# Patient Record
Sex: MALE | Race: WHITE | NOT HISPANIC OR LATINO | RURAL
[De-identification: names, ages, dates, MRNs, and addresses within clinical notes are randomized per-mention and may not be internally consistent; named-entity substitution may affect disease eponyms.]

---

## 2022-03-23 ENCOUNTER — OFFICE VISIT (OUTPATIENT)
Dept: RURAL CLINIC 8 | Facility: CLINIC | Age: 77
End: 2022-03-23
Payer: MEDICARE

## 2022-03-23 DIAGNOSIS — K92.1 HEMATOCHEZIA: ICD-10-CM

## 2022-03-23 DIAGNOSIS — R19.5 LOOSE STOOLS: ICD-10-CM

## 2022-03-23 DIAGNOSIS — K86.2 PANCREATIC CYST: ICD-10-CM

## 2022-03-23 DIAGNOSIS — K86.89 PANCREATIC DUCT DILATED: ICD-10-CM

## 2022-03-23 DIAGNOSIS — K76.89 LIVER CYST: ICD-10-CM

## 2022-03-23 PROBLEM — 85057007: Status: ACTIVE | Noted: 2022-03-23

## 2022-03-23 PROCEDURE — 99204 OFFICE O/P NEW MOD 45 MIN: CPT | Performed by: INTERNAL MEDICINE

## 2022-03-23 RX ORDER — LOSARTAN POTASSIUM AND HYDROCHLOROTHIAZIDE 50; 12.5 MG/1; MG/1
1 TABLET TABLET, FILM COATED ORAL ONCE A DAY
Status: ACTIVE | COMMUNITY

## 2022-03-23 RX ORDER — AMLODIPINE BESYLATE 10 MG/1
1 TABLET TABLET ORAL ONCE A DAY
Status: ACTIVE | COMMUNITY

## 2022-03-23 NOTE — HPI-TODAY'S VISIT:
Patient mainly comes for an evaluation for diarrhea.  However, was able to review MRI of the abdomen that showed innumerable cysts throughout the pancreas.  The largest was 2.6 mm.  There were also several cysts within the liver.  There was dilation of the ventral pancreatic duct up to the level of the ampulla. - He was scheduled to see Dr. Ramirez for endoscopy and colonoscopy next month. - Pt days that about two months ago he had blood in the stool and was sent to the emergency room. it was enough to turn the  commode water red for three days in a row. -

## 2022-03-24 ENCOUNTER — TELEPHONE ENCOUNTER (OUTPATIENT)
Dept: URBAN - METROPOLITAN AREA CLINIC 105 | Facility: CLINIC | Age: 77
End: 2022-03-24

## 2022-03-24 ENCOUNTER — LAB OUTSIDE AN ENCOUNTER (OUTPATIENT)
Dept: URBAN - METROPOLITAN AREA CLINIC 105 | Facility: CLINIC | Age: 77
End: 2022-03-24

## 2022-04-28 ENCOUNTER — OFFICE VISIT (OUTPATIENT)
Dept: URBAN - METROPOLITAN AREA MEDICAL CENTER 33 | Facility: MEDICAL CENTER | Age: 77
End: 2022-04-28
Payer: MEDICARE

## 2022-04-28 DIAGNOSIS — C25.0 ADENOCARCINOMA OF HEAD OF PANCREAS: ICD-10-CM

## 2022-04-28 DIAGNOSIS — R13.19 CERVICAL DYSPHAGIA: ICD-10-CM

## 2022-04-28 DIAGNOSIS — K22.89 ESOPHAGEAL BLEED, NON-VARICEAL: ICD-10-CM

## 2022-04-28 PROCEDURE — 43239 EGD BIOPSY SINGLE/MULTIPLE: CPT | Performed by: INTERNAL MEDICINE

## 2022-04-28 PROCEDURE — 43242 EGD US FINE NEEDLE BX/ASPIR: CPT | Performed by: INTERNAL MEDICINE

## 2022-04-28 PROCEDURE — 43249 ESOPH EGD DILATION <30 MM: CPT | Performed by: INTERNAL MEDICINE

## 2022-04-28 RX ORDER — LOSARTAN POTASSIUM AND HYDROCHLOROTHIAZIDE 50; 12.5 MG/1; MG/1
1 TABLET TABLET, FILM COATED ORAL ONCE A DAY
Status: ACTIVE | COMMUNITY

## 2022-04-28 RX ORDER — AMLODIPINE BESYLATE 10 MG/1
1 TABLET TABLET ORAL ONCE A DAY
Status: ACTIVE | COMMUNITY

## 2022-07-19 NOTE — PHYSICAL EXAM NECK/THYROID:
Called mother, no answer, left message requesting call back.   normal appearance , without tenderness upon palpation , no deformities , trachea midline , Thyroid normal size , no thyroid nodules , no masses , no JVD , thyroid nontender

## 2023-11-21 ENCOUNTER — LAB OUTSIDE AN ENCOUNTER (OUTPATIENT)
Dept: RURAL CLINIC 2 | Facility: CLINIC | Age: 78
End: 2023-11-21

## 2023-11-21 ENCOUNTER — OFFICE VISIT (OUTPATIENT)
Dept: RURAL CLINIC 2 | Facility: CLINIC | Age: 78
End: 2023-11-21
Payer: MEDICARE

## 2023-11-21 ENCOUNTER — DASHBOARD ENCOUNTERS (OUTPATIENT)
Age: 78
End: 2023-11-21

## 2023-11-21 VITALS
WEIGHT: 175 LBS | SYSTOLIC BLOOD PRESSURE: 110 MMHG | BODY MASS INDEX: 25.05 KG/M2 | HEART RATE: 67 BPM | HEIGHT: 70 IN | TEMPERATURE: 97.1 F | DIASTOLIC BLOOD PRESSURE: 74 MMHG

## 2023-11-21 DIAGNOSIS — R13.19 ESOPHAGEAL DYSPHAGIA: ICD-10-CM

## 2023-11-21 DIAGNOSIS — Z86.79 PERSONAL HISTORY OF OTHER DISEASES OF THE CIRCULATORY SYSTEM: ICD-10-CM

## 2023-11-21 DIAGNOSIS — I25.2 HISTORY OF MI (MYOCARDIAL INFARCTION): ICD-10-CM

## 2023-11-21 DIAGNOSIS — Z90.410 ACQUIRED TOTAL ABSENCE OF PANCREAS: ICD-10-CM

## 2023-11-21 PROBLEM — 363418001: Status: ACTIVE | Noted: 2023-11-21

## 2023-11-21 PROBLEM — 399211009: Status: ACTIVE | Noted: 2023-11-21

## 2023-11-21 PROBLEM — 40890009: Status: ACTIVE | Noted: 2023-11-21

## 2023-11-21 PROBLEM — 266995000: Status: ACTIVE | Noted: 2023-11-21

## 2023-11-21 PROBLEM — 65958008: Status: ACTIVE | Noted: 2023-11-21

## 2023-11-21 PROBLEM — 112371000119108: Status: ACTIVE | Noted: 2023-11-21

## 2023-11-21 PROBLEM — 235595009: Status: ACTIVE | Noted: 2023-11-21

## 2023-11-21 PROBLEM — 428375006: Status: ACTIVE | Noted: 2023-11-21

## 2023-11-21 PROBLEM — 84410009: Status: ACTIVE | Noted: 2023-11-21

## 2023-11-21 PROCEDURE — 99214 OFFICE O/P EST MOD 30 MIN: CPT | Performed by: NURSE PRACTITIONER

## 2023-11-21 RX ORDER — AMLODIPINE BESYLATE 10 MG/1
1 TABLET TABLET ORAL ONCE A DAY
Status: ACTIVE | COMMUNITY

## 2023-11-21 RX ORDER — PANCRELIPASE 36000; 180000; 114000 [USP'U]/1; [USP'U]/1; [USP'U]/1
AS DIRECTED CAPSULE, DELAYED RELEASE PELLETS ORAL
Status: ACTIVE | COMMUNITY

## 2023-11-21 RX ORDER — LOSARTAN POTASSIUM AND HYDROCHLOROTHIAZIDE 50; 12.5 MG/1; MG/1
1 TABLET TABLET, FILM COATED ORAL ONCE A DAY
Status: DISCONTINUED | COMMUNITY

## 2023-11-21 RX ORDER — METFORMIN HYDROCHLORIDE 500 MG/1
1 TABLET WITH A MEAL TABLET, FILM COATED ORAL ONCE A DAY
Status: ACTIVE | COMMUNITY

## 2023-11-21 RX ORDER — PANTOPRAZOLE SODIUM 40 MG/1
1 TABLET TABLET, DELAYED RELEASE ORAL ONCE A DAY
Status: ACTIVE | COMMUNITY

## 2023-11-21 RX ORDER — ATORVASTATIN CALCIUM 80 MG/1
1 TABLET TABLET, FILM COATED ORAL ONCE A DAY
Status: ACTIVE | COMMUNITY

## 2023-11-21 RX ORDER — TAMSULOSIN HYDROCHLORIDE 0.4 MG/1
1 CAPSULE CAPSULE ORAL ONCE A DAY
Status: ACTIVE | COMMUNITY

## 2023-11-21 RX ORDER — ASPIRIN 81 MG/1
1 TABLET TABLET, CHEWABLE ORAL ONCE A DAY
Status: ACTIVE | COMMUNITY

## 2023-11-21 RX ORDER — METOPROLOL TARTRATE 25 MG/1
1 TABLET WITH FOOD TABLET, FILM COATED ORAL TWICE A DAY
Status: ACTIVE | COMMUNITY

## 2023-11-21 NOTE — HPI-ZZZTODAY'S VISIT
The patient is being seen today for complaints of dysphagia with persistent hiccups.  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  13 mm barium pill did not pass into the stomach.  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  During his hospital stay he was offered to undergo an upper endoscopy for dysphagia and declined.  He is here today to undergo an upper endoscopy outpatient.  He is currently tolerating liquids and very soft foods.  He denies choking.  He continues with persistent hiccups and was seen at the local emergency room last night and received a prescription for Thorazine which she has not filled as of yet.  Other history includes pancreatic cancer status post Whipple's in 2022.